# Patient Record
Sex: FEMALE | Race: WHITE | NOT HISPANIC OR LATINO | Employment: OTHER | URBAN - NONMETROPOLITAN AREA
[De-identification: names, ages, dates, MRNs, and addresses within clinical notes are randomized per-mention and may not be internally consistent; named-entity substitution may affect disease eponyms.]

---

## 2019-11-27 ENCOUNTER — APPOINTMENT (OUTPATIENT)
Dept: CT IMAGING | Facility: HOSPITAL | Age: 69
End: 2019-11-27

## 2019-11-27 ENCOUNTER — HOSPITAL ENCOUNTER (EMERGENCY)
Facility: HOSPITAL | Age: 69
Discharge: HOME | End: 2019-11-27
Admitting: EMERGENCY MEDICINE

## 2019-11-27 ENCOUNTER — APPOINTMENT (OUTPATIENT)
Dept: GENERAL RADIOLOGY | Facility: HOSPITAL | Age: 69
End: 2019-11-27

## 2019-11-27 VITALS
HEIGHT: 62 IN | SYSTOLIC BLOOD PRESSURE: 124 MMHG | OXYGEN SATURATION: 98 % | BODY MASS INDEX: 27.42 KG/M2 | TEMPERATURE: 98.3 F | WEIGHT: 149 LBS | RESPIRATION RATE: 14 BRPM | DIASTOLIC BLOOD PRESSURE: 43 MMHG | HEART RATE: 80 BPM

## 2019-11-27 DIAGNOSIS — K86.89 PANCREATIC MASS: Primary | ICD-10-CM

## 2019-11-27 DIAGNOSIS — N30.90 CYSTITIS: ICD-10-CM

## 2019-11-27 DIAGNOSIS — R11.2 NAUSEA AND VOMITING, INTRACTABILITY OF VOMITING NOT SPECIFIED, UNSPECIFIED VOMITING TYPE: ICD-10-CM

## 2019-11-27 LAB
ALBUMIN SERPL-MCNC: 4.5 G/DL (ref 3.5–5.2)
ALBUMIN/GLOB SERPL: 1.6 G/DL
ALP SERPL-CCNC: 84 U/L (ref 39–117)
ALT SERPL W P-5'-P-CCNC: 20 U/L (ref 1–33)
ANION GAP SERPL CALCULATED.3IONS-SCNC: 14 MMOL/L (ref 5–15)
APTT PPP: 27.6 SECONDS (ref 24.1–35)
AST SERPL-CCNC: 21 U/L (ref 1–32)
BACTERIA UR QL AUTO: ABNORMAL /HPF
BASOPHILS # BLD AUTO: 0.06 10*3/MM3 (ref 0–0.2)
BASOPHILS NFR BLD AUTO: 0.6 % (ref 0–1.5)
BILIRUB SERPL-MCNC: 0.3 MG/DL (ref 0.2–1.2)
BILIRUB UR QL STRIP: NEGATIVE
BUN BLD-MCNC: 17 MG/DL (ref 8–23)
BUN/CREAT SERPL: 21.5 (ref 7–25)
CALCIUM SPEC-SCNC: 9.4 MG/DL (ref 8.6–10.5)
CHLORIDE SERPL-SCNC: 97 MMOL/L (ref 98–107)
CLARITY UR: CLEAR
CO2 SERPL-SCNC: 27 MMOL/L (ref 22–29)
COLOR UR: YELLOW
CREAT BLD-MCNC: 0.79 MG/DL (ref 0.57–1)
D-LACTATE SERPL-SCNC: 2 MMOL/L (ref 0.5–2)
DEPRECATED RDW RBC AUTO: 41 FL (ref 37–54)
EOSINOPHIL # BLD AUTO: 0.02 10*3/MM3 (ref 0–0.4)
EOSINOPHIL NFR BLD AUTO: 0.2 % (ref 0.3–6.2)
ERYTHROCYTE [DISTWIDTH] IN BLOOD BY AUTOMATED COUNT: 12.4 % (ref 12.3–15.4)
GFR SERPL CREATININE-BSD FRML MDRD: 72 ML/MIN/1.73
GLOBULIN UR ELPH-MCNC: 2.8 GM/DL
GLUCOSE BLD-MCNC: 173 MG/DL (ref 65–99)
GLUCOSE BLDC GLUCOMTR-MCNC: 176 MG/DL (ref 70–130)
GLUCOSE UR STRIP-MCNC: NEGATIVE MG/DL
HCT VFR BLD AUTO: 42.6 % (ref 34–46.6)
HGB BLD-MCNC: 14.1 G/DL (ref 12–15.9)
HGB UR QL STRIP.AUTO: NEGATIVE
HYALINE CASTS UR QL AUTO: ABNORMAL /LPF
IMM GRANULOCYTES # BLD AUTO: 0.03 10*3/MM3 (ref 0–0.05)
IMM GRANULOCYTES NFR BLD AUTO: 0.3 % (ref 0–0.5)
INR PPP: 0.91 (ref 0.91–1.09)
KETONES UR QL STRIP: NEGATIVE
LEUKOCYTE ESTERASE UR QL STRIP.AUTO: ABNORMAL
LIPASE SERPL-CCNC: 26 U/L (ref 13–60)
LYMPHOCYTES # BLD AUTO: 0.87 10*3/MM3 (ref 0.7–3.1)
LYMPHOCYTES NFR BLD AUTO: 9.3 % (ref 19.6–45.3)
MAGNESIUM SERPL-MCNC: 1.6 MG/DL (ref 1.6–2.4)
MCH RBC QN AUTO: 29.7 PG (ref 26.6–33)
MCHC RBC AUTO-ENTMCNC: 33.1 G/DL (ref 31.5–35.7)
MCV RBC AUTO: 89.9 FL (ref 79–97)
MONOCYTES # BLD AUTO: 0.3 10*3/MM3 (ref 0.1–0.9)
MONOCYTES NFR BLD AUTO: 3.2 % (ref 5–12)
NEUTROPHILS # BLD AUTO: 8.05 10*3/MM3 (ref 1.7–7)
NEUTROPHILS NFR BLD AUTO: 86.4 % (ref 42.7–76)
NITRITE UR QL STRIP: NEGATIVE
NRBC BLD AUTO-RTO: 0 /100 WBC (ref 0–0.2)
PH UR STRIP.AUTO: 6 [PH] (ref 5–8)
PLATELET # BLD AUTO: 329 10*3/MM3 (ref 140–450)
PMV BLD AUTO: 9.5 FL (ref 6–12)
POTASSIUM BLD-SCNC: 4.1 MMOL/L (ref 3.5–5.2)
PROT SERPL-MCNC: 7.3 G/DL (ref 6–8.5)
PROT UR QL STRIP: NEGATIVE
PROTHROMBIN TIME: 12.5 SECONDS (ref 11.9–14.6)
RBC # BLD AUTO: 4.74 10*6/MM3 (ref 3.77–5.28)
RBC # UR: ABNORMAL /HPF
REF LAB TEST METHOD: ABNORMAL
SODIUM BLD-SCNC: 138 MMOL/L (ref 136–145)
SP GR UR STRIP: 1.02 (ref 1–1.03)
SQUAMOUS #/AREA URNS HPF: ABNORMAL /HPF
TROPONIN T SERPL-MCNC: <0.01 NG/ML (ref 0–0.03)
TSH SERPL DL<=0.05 MIU/L-ACNC: 0.68 UIU/ML (ref 0.27–4.2)
UROBILINOGEN UR QL STRIP: ABNORMAL
WBC NRBC COR # BLD: 9.33 10*3/MM3 (ref 3.4–10.8)
WBC UR QL AUTO: ABNORMAL /HPF

## 2019-11-27 PROCEDURE — 96375 TX/PRO/DX INJ NEW DRUG ADDON: CPT

## 2019-11-27 PROCEDURE — 71045 X-RAY EXAM CHEST 1 VIEW: CPT

## 2019-11-27 PROCEDURE — 85610 PROTHROMBIN TIME: CPT | Performed by: PHYSICIAN ASSISTANT

## 2019-11-27 PROCEDURE — 25010000002 ONDANSETRON PER 1 MG: Performed by: PHYSICIAN ASSISTANT

## 2019-11-27 PROCEDURE — 84484 ASSAY OF TROPONIN QUANT: CPT | Performed by: PHYSICIAN ASSISTANT

## 2019-11-27 PROCEDURE — 96365 THER/PROPH/DIAG IV INF INIT: CPT

## 2019-11-27 PROCEDURE — 80053 COMPREHEN METABOLIC PANEL: CPT | Performed by: PHYSICIAN ASSISTANT

## 2019-11-27 PROCEDURE — 93005 ELECTROCARDIOGRAM TRACING: CPT | Performed by: PHYSICIAN ASSISTANT

## 2019-11-27 PROCEDURE — 25010000002 CEFTRIAXONE PER 250 MG: Performed by: PHYSICIAN ASSISTANT

## 2019-11-27 PROCEDURE — 83605 ASSAY OF LACTIC ACID: CPT | Performed by: PHYSICIAN ASSISTANT

## 2019-11-27 PROCEDURE — 85025 COMPLETE CBC W/AUTO DIFF WBC: CPT | Performed by: PHYSICIAN ASSISTANT

## 2019-11-27 PROCEDURE — 99284 EMERGENCY DEPT VISIT MOD MDM: CPT

## 2019-11-27 PROCEDURE — 81001 URINALYSIS AUTO W/SCOPE: CPT | Performed by: PHYSICIAN ASSISTANT

## 2019-11-27 PROCEDURE — 82962 GLUCOSE BLOOD TEST: CPT

## 2019-11-27 PROCEDURE — 83735 ASSAY OF MAGNESIUM: CPT | Performed by: PHYSICIAN ASSISTANT

## 2019-11-27 PROCEDURE — 74177 CT ABD & PELVIS W/CONTRAST: CPT

## 2019-11-27 PROCEDURE — 25010000002 IOPAMIDOL 61 % SOLUTION: Performed by: PHYSICIAN ASSISTANT

## 2019-11-27 PROCEDURE — 84443 ASSAY THYROID STIM HORMONE: CPT | Performed by: PHYSICIAN ASSISTANT

## 2019-11-27 PROCEDURE — 83690 ASSAY OF LIPASE: CPT | Performed by: PHYSICIAN ASSISTANT

## 2019-11-27 PROCEDURE — 93010 ELECTROCARDIOGRAM REPORT: CPT | Performed by: INTERNAL MEDICINE

## 2019-11-27 PROCEDURE — 85730 THROMBOPLASTIN TIME PARTIAL: CPT | Performed by: PHYSICIAN ASSISTANT

## 2019-11-27 PROCEDURE — 87040 BLOOD CULTURE FOR BACTERIA: CPT | Performed by: PHYSICIAN ASSISTANT

## 2019-11-27 RX ORDER — LEVOTHYROXINE SODIUM 0.07 MG/1
75 TABLET ORAL DAILY
COMMUNITY
End: 2022-09-08 | Stop reason: DRUGHIGH

## 2019-11-27 RX ORDER — ONDANSETRON 4 MG/1
4 TABLET, FILM COATED ORAL EVERY 8 HOURS PRN
Qty: 12 TABLET | Refills: 0 | Status: SHIPPED | OUTPATIENT
Start: 2019-11-27 | End: 2021-06-23

## 2019-11-27 RX ORDER — ROSUVASTATIN CALCIUM 20 MG/1
20 TABLET, COATED ORAL DAILY
COMMUNITY

## 2019-11-27 RX ORDER — MAGNESIUM OXIDE 400 MG/1
250 TABLET ORAL 2 TIMES DAILY
COMMUNITY
End: 2021-06-23

## 2019-11-27 RX ORDER — FLUTICASONE PROPIONATE 50 MCG
2 SPRAY, SUSPENSION (ML) NASAL DAILY
COMMUNITY

## 2019-11-27 RX ORDER — ONDANSETRON 2 MG/ML
4 INJECTION INTRAMUSCULAR; INTRAVENOUS ONCE
Status: COMPLETED | OUTPATIENT
Start: 2019-11-27 | End: 2019-11-27

## 2019-11-27 RX ORDER — TRIAMCINOLONE ACETONIDE 0.25 MG/G
1 CREAM TOPICAL 2 TIMES DAILY
COMMUNITY

## 2019-11-27 RX ORDER — PANTOPRAZOLE SODIUM 20 MG/1
20 TABLET, DELAYED RELEASE ORAL DAILY
COMMUNITY
End: 2021-06-23

## 2019-11-27 RX ORDER — CEPHALEXIN 500 MG/1
500 CAPSULE ORAL 3 TIMES DAILY
Qty: 30 CAPSULE | Refills: 0 | Status: SHIPPED | OUTPATIENT
Start: 2019-11-27 | End: 2021-06-23

## 2019-11-27 RX ADMIN — CEFTRIAXONE SODIUM 1 G: 1 INJECTION, POWDER, FOR SOLUTION INTRAMUSCULAR; INTRAVENOUS at 20:19

## 2019-11-27 RX ADMIN — IOPAMIDOL 100 ML: 612 INJECTION, SOLUTION INTRAVENOUS at 18:40

## 2019-11-27 RX ADMIN — ONDANSETRON 4 MG: 2 INJECTION INTRAMUSCULAR; INTRAVENOUS at 17:18

## 2019-11-27 RX ADMIN — SODIUM CHLORIDE 1000 ML: 9 INJECTION, SOLUTION INTRAVENOUS at 17:17

## 2019-12-02 LAB
BACTERIA SPEC AEROBE CULT: NORMAL
BACTERIA SPEC AEROBE CULT: NORMAL

## 2019-12-30 ENCOUNTER — OFFICE VISIT (OUTPATIENT)
Dept: GASTROENTEROLOGY | Facility: CLINIC | Age: 69
End: 2019-12-30

## 2019-12-30 VITALS
WEIGHT: 148 LBS | TEMPERATURE: 97 F | DIASTOLIC BLOOD PRESSURE: 60 MMHG | HEIGHT: 63 IN | OXYGEN SATURATION: 97 % | BODY MASS INDEX: 26.22 KG/M2 | SYSTOLIC BLOOD PRESSURE: 126 MMHG | HEART RATE: 84 BPM

## 2019-12-30 DIAGNOSIS — K86.2 PANCREAS CYST: Primary | ICD-10-CM

## 2019-12-30 DIAGNOSIS — K21.9 GASTROESOPHAGEAL REFLUX DISEASE, ESOPHAGITIS PRESENCE NOT SPECIFIED: ICD-10-CM

## 2019-12-30 PROCEDURE — 99204 OFFICE O/P NEW MOD 45 MIN: CPT | Performed by: INTERNAL MEDICINE

## 2019-12-30 RX ORDER — SUCRALFATE 1 G/1
1 TABLET ORAL 4 TIMES DAILY
COMMUNITY

## 2019-12-30 RX ORDER — LISINOPRIL 20 MG/1
20 TABLET ORAL DAILY
COMMUNITY

## 2019-12-30 NOTE — PROGRESS NOTES
Chief Complaint   Patient presents with   • Imaging Only     had ct showed panc. mass       PCP: Sandeep White MD  REFER: Sally Marrufo, *    Subjective     Heartburn   She complains of choking. She reports no abdominal pain, no chest pain, no coughing, no globus sensation, no hoarse voice, no nausea, no sore throat or no wheezing. This is a recurrent problem. The current episode started more than 1 month ago. The problem occurs rarely. The problem has been resolved. Pertinent negatives include no fatigue or weight loss. She has tried a PPI for the symptoms. The treatment provided significant relief. Past procedures include an EGD.      Recently in our ER back in NOV for n/v abd pain all have resolved blamed on a UTI/URI  CT showed a Panc mass   Past Medical History:   Diagnosis Date   • Cancer (CMS/HCC)    • Diabetes mellitus (CMS/HCC)    • Disease of thyroid gland    • Hypertension    • Seizures (CMS/HCC)        Past Surgical History:   Procedure Laterality Date   • BREAST SURGERY         Outpatient Medications Marked as Taking for the 12/30/19 encounter (Office Visit) with Jori Sanon, DO   Medication Sig Dispense Refill   • fluticasone (FLONASE) 50 MCG/ACT nasal spray 2 sprays into the nostril(s) as directed by provider Daily.     • levothyroxine (SYNTHROID, LEVOTHROID) 75 MCG tablet Take 75 mcg by mouth Daily.     • lisinopril (PRINIVIL,ZESTRIL) 20 MG tablet Take 20 mg by mouth Daily.     • magnesium oxide (MAG-OX) 400 MG tablet Take 250 mg by mouth 2 (Two) Times a Day.     • metFORMIN (GLUCOPHAGE) 500 MG tablet Take 500 mg by mouth 2 (Two) Times a Day With Meals.     • pantoprazole (PROTONIX) 20 MG EC tablet Take 20 mg by mouth Daily.     • rosuvastatin (CRESTOR) 20 MG tablet Take 20 mg by mouth Daily.     • sucralfate (CARAFATE) 1 g tablet Take 1 g by mouth 4 (Four) Times a Day.     • triamcinolone (KENALOG) 0.025 % cream Apply 1 application topically to the appropriate area as directed 2 (Two)  "Times a Day.         No Known Allergies    Social History     Socioeconomic History   • Marital status:      Spouse name: Not on file   • Number of children: Not on file   • Years of education: Not on file   • Highest education level: Not on file   Tobacco Use   • Smoking status: Never Smoker   • Smokeless tobacco: Never Used   Substance and Sexual Activity   • Alcohol use: No     Frequency: Never   • Drug use: No       Family History   Problem Relation Age of Onset   • Colon cancer Sister    • Colon polyps Neg Hx    • Esophageal cancer Neg Hx        Review of Systems   Constitutional: Negative for fatigue, fever, unexpected weight change and weight loss.   HENT: Negative for hearing loss, hoarse voice, sore throat and voice change.    Eyes: Negative for visual disturbance.   Respiratory: Positive for choking. Negative for cough, shortness of breath and wheezing.    Cardiovascular: Negative for chest pain and palpitations.   Gastrointestinal: Negative for abdominal pain, blood in stool, nausea and vomiting.   Endocrine: Negative for polydipsia and polyuria.   Genitourinary: Negative for difficulty urinating, dysuria, hematuria and urgency.   Musculoskeletal: Negative for joint swelling and myalgias.   Skin: Negative for color change, rash and wound.   Neurological: Negative for dizziness, tremors, seizures and syncope.   Hematological: Does not bruise/bleed easily.   Psychiatric/Behavioral: Negative for agitation and confusion. The patient is not nervous/anxious.        Objective     Vitals:    12/30/19 1402   BP: 126/60   Pulse: 84   Temp: 97 °F (36.1 °C)   SpO2: 97%   Weight: 67.1 kg (148 lb)   Height: 158.8 cm (62.5\")     Body mass index is 26.64 kg/m².    Physical Exam   Constitutional: She is oriented to person, place, and time. She appears well-developed and well-nourished.   HENT:   Head: Normocephalic and atraumatic.   Eyes: Pupils are equal, round, and reactive to light. Conjunctivae are normal. No " scleral icterus.   Neck: No JVD present. No thyroid mass and no thyromegaly present.   Cardiovascular: Normal rate, regular rhythm and normal heart sounds. Exam reveals no gallop and no friction rub.   No murmur heard.  Pulmonary/Chest: Effort normal and breath sounds normal. No accessory muscle usage. No respiratory distress. She has no wheezes. She has no rales.   Abdominal: Soft. Bowel sounds are normal. She exhibits no distension, no ascites and no mass. There is no splenomegaly or hepatomegaly. There is no tenderness. There is no rebound and no guarding.   Genitourinary:   Genitourinary Comments: Rectal-Did not examine   Musculoskeletal: Normal range of motion. She exhibits no edema.   Neurological: She is alert and oriented to person, place, and time.   Deemed a reliable historian, able to converse without difficulty and able to move all extremities without difficulty   Skin: Skin is warm and dry.   Psychiatric: She has a normal mood and affect. Her behavior is normal.       Imaging Results (Most Recent)     None          Body mass index is 26.64 kg/m².    Assessment/Plan     Sandy was seen today for imaging only.    Diagnoses and all orders for this visit:    Pancreas cyst    Gastroesophageal reflux disease, esophagitis presence not specified    gave her the options of EUS vs a MRI in 6-9 months  She wants to think about the 2 options and will let me know    * Surgery not found *    Patient's Body mass index is 26.64 kg/m². BMI is within normal parameters. No follow-up required..      There are no Patient Instructions on file for this visit.

## 2021-06-23 ENCOUNTER — OFFICE VISIT (OUTPATIENT)
Dept: OBSTETRICS AND GYNECOLOGY | Facility: CLINIC | Age: 71
End: 2021-06-23

## 2021-06-23 VITALS
DIASTOLIC BLOOD PRESSURE: 74 MMHG | HEIGHT: 63 IN | BODY MASS INDEX: 22.86 KG/M2 | WEIGHT: 129 LBS | SYSTOLIC BLOOD PRESSURE: 144 MMHG

## 2021-06-23 DIAGNOSIS — N81.4 UTERINE PROLAPSE: ICD-10-CM

## 2021-06-23 DIAGNOSIS — N81.10 FEMALE CYSTOCELE: Primary | ICD-10-CM

## 2021-06-23 DIAGNOSIS — N81.6 RECTOCELE: ICD-10-CM

## 2021-06-23 DIAGNOSIS — Z46.89 FITTING AND ADJUSTMENT OF PESSARY: ICD-10-CM

## 2021-06-23 PROCEDURE — A4562 PESSARY, NON RUBBER,ANY TYPE: HCPCS | Performed by: OBSTETRICS & GYNECOLOGY

## 2021-06-23 PROCEDURE — 99203 OFFICE O/P NEW LOW 30 MIN: CPT | Performed by: OBSTETRICS & GYNECOLOGY

## 2021-06-23 PROCEDURE — 57160 INSERT PESSARY/OTHER DEVICE: CPT | Performed by: OBSTETRICS & GYNECOLOGY

## 2021-06-23 RX ORDER — DIPHENHYDRAMINE HCL 25 MG
25 TABLET ORAL EVERY 6 HOURS PRN
COMMUNITY

## 2021-06-23 RX ORDER — A/SINGAPORE/GP1908/2015 IVR-180 (AN A/MICHIGAN/45/2015 (H1N1)PDM09-LIKE VIRUS, A/HONG KONG/4801/2014, NYMC X-263B (H3N2) (AN A/HONG KONG/4801/2014-LIKE VIRUS), AND B/BRISBANE/60/2008, WILD TYPE (A B/BRISBANE/60/2008-LIKE VIRUS) 15; 15; 15 UG/.5ML; UG/.5ML; UG/.5ML
INJECTION, SUSPENSION INTRAMUSCULAR
COMMUNITY
End: 2022-06-09

## 2021-06-23 NOTE — PROGRESS NOTES
"Subjective     Chief Complaint   Patient presents with   • Vaginal Prolapse     pt here today as new pt for prolapse. pt voices that her prolapse feels like she is sitting on an egg. pt also voices having urinary frequency. pt voices no other concerns.        Sandy LAW is a 71 y.o. year old who presents to be seen for pelvic prolapse.  Patient c/o pressure from \"bulge\" that makes her feel like she is \"sitting on an egg\".    The patient is not s/p hysterectomy.  She reports positive vaginal pressure, bulge outside her body, urinary incontinence and urinary hesitancy, but denies urinary retention causing recurrent bladder infections and stool trapping.  The patient feels like the problem began 4-5 years ago.  She is not currently sexually active.  Sandy LAW has not had surgery for this problem in the past.    Past Medical History:   Diagnosis Date   • Breast cancer (CMS/HCC)    • Diabetes mellitus (CMS/HCC)    • Disease of thyroid gland    • Hypertension    • Seizures (CMS/HCC)        Current Outpatient Medications:   •  diphenhydrAMINE (Benadryl Allergy) 25 MG tablet, Take 25 mg by mouth Every 6 (Six) Hours As Needed for Itching., Disp: , Rfl:   •  fluticasone (FLONASE) 50 MCG/ACT nasal spray, 2 sprays into the nostril(s) as directed by provider Daily., Disp: , Rfl:   •  Influenza Vac A&B SA Adj Quad (Fluad Quadrivalent) 0.5 ML prefilled syringe injection, Fluad Quad 0969-0719(65yr up)(PF) 60 mcg (15 mcg x 4)/0.5mL IM syringe, Disp: , Rfl:   •  levothyroxine (SYNTHROID, LEVOTHROID) 75 MCG tablet, Take 75 mcg by mouth Daily., Disp: , Rfl:   •  lisinopril (PRINIVIL,ZESTRIL) 20 MG tablet, Take 20 mg by mouth Daily., Disp: , Rfl:   •  metFORMIN (GLUCOPHAGE) 500 MG tablet, Take 500 mg by mouth 2 (Two) Times a Day With Meals., Disp: , Rfl:   •  rosuvastatin (CRESTOR) 20 MG tablet, Take 20 mg by mouth Daily., Disp: , Rfl:   •  sucralfate (CARAFATE) 1 g tablet, Take 1 g by mouth 4 (Four) Times a Day., Disp: , " "Rfl:   •  triamcinolone (KENALOG) 0.025 % cream, Apply 1 application topically to the appropriate area as directed 2 (Two) Times a Day., Disp: , Rfl:   Family History   Problem Relation Age of Onset   • Colon cancer Sister    • Prostate cancer Brother    • Colon polyps Neg Hx    • Esophageal cancer Neg Hx      Social History     Socioeconomic History   • Marital status:      Spouse name: Not on file   • Number of children: Not on file   • Years of education: Not on file   • Highest education level: Not on file   Tobacco Use   • Smoking status: Never Smoker   • Smokeless tobacco: Never Used   Substance and Sexual Activity   • Alcohol use: No   • Drug use: No   • Sexual activity: Not Currently     Partners: Male     No Known Allergies    Family History   Problem Relation Age of Onset   • Colon cancer Sister    • Prostate cancer Brother    • Colon polyps Neg Hx    • Esophageal cancer Neg Hx      Review of Systems   Constitutional: Negative for activity change and unexpected weight change.   Eyes: Positive for visual disturbance (wears glasses).   Respiratory: Negative for shortness of breath.    Cardiovascular: Negative for chest pain.   Gastrointestinal: Positive for diarrhea (sometimes). Negative for abdominal pain.   Genitourinary: Positive for difficulty urinating (hesitancy), enuresis (both JOHN and urge incontinence based on history) and vaginal bleeding (there has been trace bleeding on her pad, but only intermittently about once/week.  Unsure whether it is vaginal or bladder). Negative for frequency.   Musculoskeletal: Positive for arthralgias. Negative for back pain.   Neurological: Positive for dizziness (positional, with fast movement). Negative for headaches.   Psychiatric/Behavioral: Positive for dysphoric mood (feels like mood is stable now, but did have depression after   last year).           Objective   /74   Ht 158.8 cm (62.5\")   Wt 58.5 kg (129 lb)   BMI 23.22 kg/m² " "    Physical Exam  Vitals and nursing note reviewed.   Constitutional:       General: She is not in acute distress.     Appearance: She is well-developed.   HENT:      Head: Normocephalic and atraumatic.   Neck:      Thyroid: No thyromegaly.   Pulmonary:      Effort: Pulmonary effort is normal.   Abdominal:      General: There is no distension.      Palpations: Abdomen is soft.      Tenderness: There is no abdominal tenderness.   Genitourinary:     General: Normal vulva.      Comments: Grade IV cystocele, grade I-II rectocele.  Moderate uterine prolapse.  Mucosa pale with atrophy.  Patient fitted with #4 incontinence dish with knob.  Musculoskeletal:         General: Normal range of motion.      Cervical back: Normal range of motion.   Skin:     General: Skin is warm and dry.   Neurological:      Mental Status: She is alert and oriented to person, place, and time.   Psychiatric:         Behavior: Behavior normal.         Judgment: Judgment normal.         Imaging   No data reviewed       Assessment & Plan    Diagnoses and all orders for this visit:    1. Female cystocele (Primary): Patient with grade 4 cystocele, and accompanying uterine prolapse approximately longterm down the length of the vagina.  Patient also has a mild rectocele, which is asymptomatic.  Bulge from cystocele is uncomfortable for patient because she feels like she is \"sitting on it\" all the time.  We have discussed the options of [facility] total laparoscopic hysterectomy with salpingo-oophorectomy and accompanying anterior vaginal repair versus use of a pessary to support her pelvic organ prolapse.  The patient would prefer to try a nonsurgical approach first and was fitted today with a pessary.  Patient reported a #4 incontinence dish with knob to be comfortable, even with different maneuvers in the office.  Patient will return to the office in 2 weeks for follow-up, and be taught how to remove, clean, and replace the pessary at that " time.  Comments:  grade IV    2. Uterine prolapse    3. Rectocele  Comments:  grade I-II    4. Fitting and adjustment of pessary          Feli Mcdonald MD  6/23/2021  10:27 CDT

## 2021-07-07 ENCOUNTER — OFFICE VISIT (OUTPATIENT)
Dept: OBSTETRICS AND GYNECOLOGY | Facility: CLINIC | Age: 71
End: 2021-07-07

## 2021-07-07 VITALS
BODY MASS INDEX: 22.86 KG/M2 | DIASTOLIC BLOOD PRESSURE: 70 MMHG | WEIGHT: 129 LBS | SYSTOLIC BLOOD PRESSURE: 122 MMHG | HEIGHT: 63 IN

## 2021-07-07 DIAGNOSIS — N81.10 FEMALE CYSTOCELE: Primary | ICD-10-CM

## 2021-07-07 DIAGNOSIS — N81.6 RECTOCELE: ICD-10-CM

## 2021-07-07 DIAGNOSIS — N81.4 UTERINE PROLAPSE: ICD-10-CM

## 2021-07-07 PROCEDURE — 99213 OFFICE O/P EST LOW 20 MIN: CPT | Performed by: OBSTETRICS & GYNECOLOGY

## 2021-07-07 NOTE — PROGRESS NOTES
"PESSARY CHECK-UP    Subjective   Chief Complaint   Patient presents with   • Pessary Check     pt here today for follow up on pessary. pt voices her pessary fell out before she got home from last visit on 6-23-21. pt voices no other concerns.       Sandy LAW is a 71 y.o. year old who presents to be seen for follow-up of her pessary.  Patient left office with a #4 incontinence dish with knob, and reports that it fell out before she ever got home.  Patient wants to try a different style/size.    Patient here two weeks ago and fitted for a pessary.  No Additional Complaints Reported    The following portions of the patient's history were reviewed and updated as appropriate:current medications and allergies    Review of Systems      Objective   /70   Ht 158.8 cm (62.5\")   Wt 58.5 kg (129 lb)   BMI 23.22 kg/m²     General:  well developed; well nourished  no acute distress   Pelvis: Clinical staff was present for exam.  Patient reported #5 long-stemmed Gelhorn to be comfortable with sitting, standing and walking.     Lab Review   No data reviewed    Imaging   No data reviewed        Assessment   1.  Patient fitted for new pessary  1. Reports to be comfortable     Plan   1. Follow up 2 weeks    No orders of the defined types were placed in this encounter.         This note was electronically signed.    Feli Mcdonald MD  July 7, 2021  10:49 CDT    "

## 2021-07-20 ENCOUNTER — OFFICE VISIT (OUTPATIENT)
Dept: OBSTETRICS AND GYNECOLOGY | Facility: CLINIC | Age: 71
End: 2021-07-20

## 2021-07-20 VITALS
DIASTOLIC BLOOD PRESSURE: 72 MMHG | BODY MASS INDEX: 22.86 KG/M2 | WEIGHT: 129 LBS | SYSTOLIC BLOOD PRESSURE: 118 MMHG | HEIGHT: 63 IN

## 2021-07-20 DIAGNOSIS — N81.6 RECTOCELE: ICD-10-CM

## 2021-07-20 DIAGNOSIS — N81.10 FEMALE CYSTOCELE: Primary | ICD-10-CM

## 2021-07-20 DIAGNOSIS — Z46.89 PESSARY MAINTENANCE: ICD-10-CM

## 2021-07-20 PROCEDURE — 99212 OFFICE O/P EST SF 10 MIN: CPT | Performed by: OBSTETRICS & GYNECOLOGY

## 2021-07-20 NOTE — PROGRESS NOTES
"PESSARY CHECK-UP    Subjective   Chief Complaint   Patient presents with   • Pessary Check     pt here today for pessary check. pt voices she is doing well and voices no concerns.      Sandy LAW is a 71 y.o. year old who presents to be seen for follow-up of her pessary.  Patient previously with a #4 incontinence dish that fell out, but it was replaced with a #5 Gelhorn two weeks ago.      Overall she is satisfied with how this pessary is doing.  She is not aware of it being present.  · She is not removing the pessary herself.  · She is able to empty were bladder without difficulty, which is an improvement.  · There is not significant urinary incontinence.  She says leakage is unchanged from previous.  · She is not having difficulty with bowel function.     No Additional Complaints Reported    The following portions of the patient's history were reviewed and updated as appropriate:current medications and allergies    Review of Systems      Objective   /72   Ht 158.8 cm (62.5\")   Wt 58.5 kg (129 lb)   BMI 23.22 kg/m²     General:  well developed; well nourished  no acute distress   Pelvis: Clinical staff was present for exam.  Pessary fits well and is in correct position.  Pessary not removed since it has only been in place for two weeks     Lab Review   No data reviewed    Imaging   No data reviewed        Assessment   1. Symptomatic prolapse - well controlled with current pessary.  2. Patient pleased and reports bladder empties better now     Plan   1. Her pessary was not removed  2. Follow up 8 weeks    No orders of the defined types were placed in this encounter.         This note was electronically signed.    Feli Mcdonald MD  July 20, 2021  11:32 CDT    "

## 2021-09-28 ENCOUNTER — OFFICE VISIT (OUTPATIENT)
Dept: OBSTETRICS AND GYNECOLOGY | Facility: CLINIC | Age: 71
End: 2021-09-28

## 2021-09-28 VITALS
WEIGHT: 135 LBS | BODY MASS INDEX: 23.92 KG/M2 | HEIGHT: 63 IN | DIASTOLIC BLOOD PRESSURE: 62 MMHG | SYSTOLIC BLOOD PRESSURE: 118 MMHG

## 2021-09-28 DIAGNOSIS — Z46.89 PESSARY MAINTENANCE: ICD-10-CM

## 2021-09-28 DIAGNOSIS — N81.4 UTERINE PROLAPSE: ICD-10-CM

## 2021-09-28 DIAGNOSIS — N81.10 FEMALE CYSTOCELE: Primary | ICD-10-CM

## 2021-09-28 DIAGNOSIS — N81.6 RECTOCELE: ICD-10-CM

## 2021-09-28 PROCEDURE — 57160 INSERT PESSARY/OTHER DEVICE: CPT | Performed by: OBSTETRICS & GYNECOLOGY

## 2021-09-28 PROCEDURE — A4562 PESSARY, NON RUBBER,ANY TYPE: HCPCS | Performed by: OBSTETRICS & GYNECOLOGY

## 2021-09-28 NOTE — PROGRESS NOTES
"PESSARY CHECK-UP    Subjective   Chief Complaint   Patient presents with   • Pessary Check     pt here today for pessary check. pt voices no concerns.      Sandy LAW is a 71 y.o. year old who presents to be seen for follow-up of her pessary.    Overall she is satisfied with how this pessary is doing.  She is not aware of it being present.  · She is not removing the pessary herself.  · She is able to empty were bladder without difficulty.  · There is not significant urinary incontinence - just a small amount.    · There is significant vaginal discharge present at times, but other times she says it is not a problem at all.  She is using nothing to help decrease her vaginal discharge - patient says that it does not really bother her.  · She is not having difficulty with bowel function.     No Additional Complaints Reported    The following portions of the patient's history were reviewed and updated as appropriate:current medications and allergies    Review of Systems      Objective   /62   Ht 158.8 cm (62.5\")   Wt 61.2 kg (135 lb)   BMI 24.30 kg/m²     General:  well developed; well nourished  no acute distress   Pelvis: Clinical staff was present for exam.  Vaginal mucosa intact, without erosion.  2 3/4 inch Gelhorn very difficult to remove, so replace with 2 1/2 inch Gelhorn today     Lab Review   No data reviewed    Imaging   No data reviewed        Assessment   1. Symptomatic prolapse - well controlled with current pessary.  2. Current pessary difficult to remove     Plan   1. Her pessary was removed, cleaned and replaced with a 2 2/1 inch Gelhorn.  2. Follow up 8 weeks    No orders of the defined types were placed in this encounter.         This note was electronically signed.    Feli Mcdonald MD  September 28, 2021  15:03 CDT    "

## 2021-12-02 ENCOUNTER — OFFICE VISIT (OUTPATIENT)
Dept: OBSTETRICS AND GYNECOLOGY | Facility: CLINIC | Age: 71
End: 2021-12-02

## 2021-12-02 VITALS
DIASTOLIC BLOOD PRESSURE: 70 MMHG | HEIGHT: 63 IN | BODY MASS INDEX: 24.1 KG/M2 | SYSTOLIC BLOOD PRESSURE: 124 MMHG | WEIGHT: 136 LBS

## 2021-12-02 DIAGNOSIS — N81.4 UTERINE PROLAPSE: ICD-10-CM

## 2021-12-02 DIAGNOSIS — N81.10 FEMALE CYSTOCELE: ICD-10-CM

## 2021-12-02 DIAGNOSIS — Z46.89 PESSARY MAINTENANCE: Primary | ICD-10-CM

## 2021-12-02 DIAGNOSIS — N81.6 RECTOCELE: ICD-10-CM

## 2021-12-02 PROCEDURE — 99213 OFFICE O/P EST LOW 20 MIN: CPT | Performed by: OBSTETRICS & GYNECOLOGY

## 2021-12-02 NOTE — PROGRESS NOTES
"PESSARY CHECK-UP    Subjective   Chief Complaint   Patient presents with   • Pessary Check     pt here today for pessary check. pt voices no concerns.      Sandy LAW is a 71 y.o. year old who presents to be seen for follow-up of her pessary.    Overall she is satisfied with how this pessary is doing.  She is not aware of it being present.  · She is not removing the pessary herself.  · She is able to empty were bladder without difficulty.  · There is not significant urinary incontinence - just a small amount.    · There is significant vaginal discharge present at times, but other times she says it is not a problem at all.  She is using nothing to help decrease her vaginal discharge - patient says that it does not really bother her.  · She is not having difficulty with bowel function.     No Additional Complaints Reported    The following portions of the patient's history were reviewed and updated as appropriate:current medications and allergies    Review of Systems      Objective   /70   Ht 158.8 cm (62.5\")   Wt 61.7 kg (136 lb)   BMI 24.48 kg/m²     General:  well developed; well nourished  no acute distress   Pelvis: Clinical staff was present for exam.  Vaginal mucosa intact, without erosion.  2 1/2 inch Gelhorn removed, cleaned and replaced.  Patient tolerated much better than last time since we decreased size slightly at her last visit.     Lab Review   No data reviewed    Imaging   No data reviewed        Assessment   1. Symptomatic prolapse - well controlled with current pessary.  2. 2 2/1 inch gelhorn much easier to remove and replace than previous 2 3/4     Plan   1. Her pessary was removed, cleaned and replaced  2. Follow up 3 months.  Patient wants to wait until March in case there is bad weather    No orders of the defined types were placed in this encounter.         This note was electronically signed.    Feli Mcdonald MD  December 2, 2021  11:52 CST    "

## 2022-03-03 ENCOUNTER — OFFICE VISIT (OUTPATIENT)
Dept: OBSTETRICS AND GYNECOLOGY | Facility: CLINIC | Age: 72
End: 2022-03-03

## 2022-03-03 VITALS
WEIGHT: 138 LBS | SYSTOLIC BLOOD PRESSURE: 164 MMHG | BODY MASS INDEX: 24.45 KG/M2 | HEIGHT: 63 IN | DIASTOLIC BLOOD PRESSURE: 84 MMHG

## 2022-03-03 DIAGNOSIS — N81.4 UTERINE PROLAPSE: ICD-10-CM

## 2022-03-03 DIAGNOSIS — N81.10 FEMALE CYSTOCELE: ICD-10-CM

## 2022-03-03 DIAGNOSIS — N81.6 RECTOCELE: ICD-10-CM

## 2022-03-03 DIAGNOSIS — Z46.89 PESSARY MAINTENANCE: Primary | ICD-10-CM

## 2022-03-03 PROCEDURE — 99213 OFFICE O/P EST LOW 20 MIN: CPT | Performed by: OBSTETRICS & GYNECOLOGY

## 2022-03-03 NOTE — PROGRESS NOTES
"PESSARY CHECK-UP    Subjective   Chief Complaint   Patient presents with   • Pessary Check     pt here today for pessary check. pt voices doing well. pt voices no concerns.      Sandy LAW is a 71 y.o. year old who presents to be seen for follow-up of her pessary.    Overall she is satisfied with how this pessary is doing.  She is not aware of it being present.  · She is not removing the pessary herself.  · She is able to empty were bladder without difficulty.  · There is not significant urinary incontinence - just a small amount.    · There is significant vaginal discharge present at times, but other times she says it is not a problem at all.  She is using nothing to help decrease her vaginal discharge - patient says that it does not really bother her.  · She is not having difficulty with bowel function.     No Additional Complaints Reported    The following portions of the patient's history were reviewed and updated as appropriate:current medications and allergies    Review of Systems   Genitourinary: Positive for enuresis (wears pads all the time). Negative for difficulty urinating and pelvic pain.         Objective   /84   Ht 158.8 cm (62.5\")   Wt 62.6 kg (138 lb)   BMI 24.84 kg/m²     General:  well developed; well nourished  no acute distress   Pelvis: Clinical staff was present for exam.  Vaginal mucosa intact, without erosion.  2 1/2 inch Gelhorn removed, cleaned and replaced.  Patient tolerated much better than last time since we decreased size slightly at her last visit.     Lab Review   No data reviewed    Imaging   No data reviewed        Assessment   1. Symptomatic prolapse - well controlled with current pessary.  2. 2 2/1 inch gelhorn much easier to remove and replace than previous 2 3/4     Plan   1. Her pessary was removed, cleaned and replaced  2. Follow up 3 months.    No orders of the defined types were placed in this encounter.         This note was electronically signed.    Feli " MD Harry  March 3, 2022  11:25 CST

## 2022-06-09 ENCOUNTER — OFFICE VISIT (OUTPATIENT)
Dept: OBSTETRICS AND GYNECOLOGY | Facility: CLINIC | Age: 72
End: 2022-06-09

## 2022-06-09 VITALS
HEIGHT: 62 IN | BODY MASS INDEX: 26.13 KG/M2 | DIASTOLIC BLOOD PRESSURE: 74 MMHG | WEIGHT: 142 LBS | SYSTOLIC BLOOD PRESSURE: 138 MMHG

## 2022-06-09 DIAGNOSIS — N81.10 FEMALE CYSTOCELE: ICD-10-CM

## 2022-06-09 DIAGNOSIS — R32 URINARY INCONTINENCE, UNSPECIFIED TYPE: ICD-10-CM

## 2022-06-09 DIAGNOSIS — Z46.89 PESSARY MAINTENANCE: Primary | ICD-10-CM

## 2022-06-09 PROCEDURE — 99213 OFFICE O/P EST LOW 20 MIN: CPT | Performed by: OBSTETRICS & GYNECOLOGY

## 2022-06-09 NOTE — PROGRESS NOTES
"PESSARY CHECK-UP    Subjective   Chief Complaint   Patient presents with   • Pessary maintanance     Pt is here for a check on her pessary.  Pt has no complaints or concerns today.      Sandy LAW is a 72 y.o. year old who presents to be seen for follow-up of her pessary.    Overall she is satisfied with how this pessary is doing.  She is not aware of it being present.  · She is not removing the pessary herself.  · She is able to empty were bladder without difficulty.  · There is urinary incontinence - just a small amount, but she has to wear a pad all the time.    · There is significant vaginal discharge present at times, but other times she says it is not a problem at all.  She is using nothing to help decrease her vaginal discharge - patient says that it does not really bother her.  · She is not having difficulty with bowel function.     No Additional Complaints Reported    The following portions of the patient's history were reviewed and updated as appropriate:current medications and allergies    Review of Systems   Respiratory: Negative for shortness of breath.    Cardiovascular: Negative for chest pain.   Genitourinary: Positive for enuresis (wears pads all the time.  No worse with pessary in). Negative for difficulty urinating and pelvic pain.         Objective   /74   Ht 157.5 cm (62\")   Wt 64.4 kg (142 lb)   BMI 25.97 kg/m²     General:  well developed; well nourished  no acute distress   Pelvis: Clinical staff was present for exam.  Vaginal mucosa intact, without erosion.  2 1/2 inch Gelhorn removed, cleaned and replaced.       Lab Review   No data reviewed    Imaging   No data reviewed        Assessment   1. Symptomatic prolapse - well controlled with current pessary.  2. 2 2/1 inch gelhorn much easier to remove and replace than previous 2 3/4     Plan   1. Her pessary was removed, cleaned and replaced  2. Follow up 3 months.  3. Again discussed surgery as an option, especially since a " concurrent sling might also help with her incontinence.  Patient says she has too much to do around her house and does not want to take the time to have surgery, at least not now    No orders of the defined types were placed in this encounter.         This note was electronically signed.    Feli Mcdonald MD  June 9, 2022  12:29 CDT

## 2022-09-08 ENCOUNTER — OFFICE VISIT (OUTPATIENT)
Dept: OBSTETRICS AND GYNECOLOGY | Facility: CLINIC | Age: 72
End: 2022-09-08

## 2022-09-08 VITALS
SYSTOLIC BLOOD PRESSURE: 142 MMHG | BODY MASS INDEX: 25.58 KG/M2 | DIASTOLIC BLOOD PRESSURE: 72 MMHG | HEIGHT: 62 IN | WEIGHT: 139 LBS

## 2022-09-08 DIAGNOSIS — N81.10 FEMALE CYSTOCELE: ICD-10-CM

## 2022-09-08 DIAGNOSIS — Z46.89 PESSARY MAINTENANCE: ICD-10-CM

## 2022-09-08 DIAGNOSIS — N81.6 RECTOCELE: ICD-10-CM

## 2022-09-08 DIAGNOSIS — R32 URINARY INCONTINENCE, UNSPECIFIED TYPE: Primary | ICD-10-CM

## 2022-09-08 DIAGNOSIS — N81.4 UTERINE PROLAPSE: ICD-10-CM

## 2022-09-08 PROCEDURE — 99213 OFFICE O/P EST LOW 20 MIN: CPT | Performed by: OBSTETRICS & GYNECOLOGY

## 2022-09-08 RX ORDER — LEVOTHYROXINE SODIUM 0.1 MG/1
TABLET ORAL
COMMUNITY
Start: 2022-06-29

## 2022-09-08 RX ORDER — METFORMIN HYDROCHLORIDE 500 MG/1
TABLET, EXTENDED RELEASE ORAL
COMMUNITY
Start: 2022-08-24

## 2022-09-08 NOTE — PROGRESS NOTES
"PESSARY CHECK-UP    Subjective   Chief Complaint   Patient presents with   • Pessary Check     Pt here today for pessary check. Pt doing well. Pt voices no concerns.      Sandy LAW is a 72 y.o. year old who presents to be seen for follow-up of her pessary.    Overall she is satisfied with how this pessary is doing.  She is not aware of it being present.  · She is not removing the pessary herself.  · She is able to empty were bladder without difficulty, but feels like it may not get completely empty all the time.  · There is urinary incontinence - just a small amount, but she has to wear a pad all the time.    · There is significant vaginal discharge present at times, but other times she says it is not a problem at all.  She is using nothing to help decrease her vaginal discharge - patient says that it does not really bother her.  · She is not having difficulty with bowel function.     No Additional Complaints Reported    The following portions of the patient's history were reviewed and updated as appropriate:current medications and allergies    Review of Systems   Respiratory: Negative for shortness of breath.    Cardiovascular: Negative for chest pain.   Genitourinary: Positive for enuresis (wears pads all the time.  No worse with pessary in). Negative for difficulty urinating and pelvic pain.         Objective   /72   Ht 157.5 cm (62\")   Wt 63 kg (139 lb)   BMI 25.42 kg/m²     General:  well developed; well nourished  no acute distress   Pelvis: Clinical staff was present for exam.  Vaginal mucosa intact, without erosion.  2 1/2 inch Gelhorn removed, cleaned and replaced.       Lab Review   No data reviewed    Imaging   No data reviewed        Assessment   1. Symptomatic prolapse - well controlled with current pessary.  2. 2 2/1 inch gelhorn much easier to remove and replace than previous 2 3/4     Plan   1. Her pessary was removed, cleaned and replaced  2. Follow up 3 months.  3. Again discussed " surgery as an option, especially since a concurrent sling might also help with her incontinence.  Patient says she cannot have surgery, at least not now, but wants to have surgical correction in the future    No orders of the defined types were placed in this encounter.         This note was electronically signed.    Feli Mcdonald MD  September 8, 2022  12:14 CDT

## 2022-12-08 ENCOUNTER — OFFICE VISIT (OUTPATIENT)
Dept: OBSTETRICS AND GYNECOLOGY | Facility: CLINIC | Age: 72
End: 2022-12-08

## 2022-12-08 VITALS
HEIGHT: 62 IN | DIASTOLIC BLOOD PRESSURE: 74 MMHG | SYSTOLIC BLOOD PRESSURE: 134 MMHG | WEIGHT: 135 LBS | BODY MASS INDEX: 24.84 KG/M2

## 2022-12-08 DIAGNOSIS — Z46.89 PESSARY MAINTENANCE: ICD-10-CM

## 2022-12-08 DIAGNOSIS — N81.4 UTERINE PROLAPSE: Primary | ICD-10-CM

## 2022-12-08 PROCEDURE — 99213 OFFICE O/P EST LOW 20 MIN: CPT | Performed by: OBSTETRICS & GYNECOLOGY

## 2022-12-27 NOTE — PROGRESS NOTES
"PESSARY CHECK-UP    Subjective   Chief Complaint   Patient presents with   • Pessary Check     Pt here today for pessary check. Pt voices no concerns.      Sandy LAW is a 72 y.o. year old who presents to be seen for 3 month follow-up of her pessary.    Overall she is satisfied with how this pessary is doing.  She is not aware of it being present.  · She is not removing the pessary herself.  · She is able to empty were bladder without difficulty, but feels like it may not get completely empty all the time - this is unchanged from previous reports.  · There is urinary incontinence - just a small amount, but she has to wear a pad all the time.    · There is significant vaginal discharge present at times, but other times she says it is not a problem at all.  She is using nothing to help decrease her vaginal discharge - patient says that it does not really bother her.  · She is not having any difficulty with bowel function.     No Additional Complaints Reported    The following portions of the patient's history were reviewed and updated as appropriate:current medications and allergies    Review of Systems   Respiratory: Negative for shortness of breath.    Cardiovascular: Negative for chest pain.   Genitourinary: Positive for enuresis (wears pads all the time.  No worse with pessary in). Negative for difficulty urinating and pelvic pain.         Objective   /74   Ht 157.5 cm (62\")   Wt 61.2 kg (135 lb)   BMI 24.69 kg/m²     General:  well developed; well nourished  no acute distress   Pelvis: Clinical staff was present for exam.  Vaginal mucosa intact, without erosion.  2 1/2 inch Gelhorn removed, cleaned and replaced.       Lab Review   No data reviewed    Imaging   No data reviewed        Assessment   1. Symptomatic prolapse - well controlled with current pessary.  2. 2 2/1 inch gelhorn much easier to remove and replace than previous 2 3/4 that Sandy wore for a long time     Plan   1. Her pessary " was removed, cleaned and replaced  2. Follow up 3 months.  3. Again discussed surgery as an option, especially since a concurrent sling might also help with her incontinence.  Patient says she cannot have surgery, at least not now, but wants to have surgical correction in the future    No orders of the defined types were placed in this encounter.         This note was electronically signed.    Feli Mcdonald MD  December 27, 2022  12:53 CST

## 2023-03-16 ENCOUNTER — OFFICE VISIT (OUTPATIENT)
Dept: OBSTETRICS AND GYNECOLOGY | Facility: CLINIC | Age: 73
End: 2023-03-16
Payer: MEDICARE

## 2023-03-16 VITALS
BODY MASS INDEX: 25.21 KG/M2 | DIASTOLIC BLOOD PRESSURE: 82 MMHG | HEIGHT: 62 IN | SYSTOLIC BLOOD PRESSURE: 130 MMHG | WEIGHT: 137 LBS

## 2023-03-16 DIAGNOSIS — N81.4 UTERINE PROLAPSE: Primary | ICD-10-CM

## 2023-03-16 DIAGNOSIS — Z46.89 PESSARY MAINTENANCE: ICD-10-CM

## 2023-03-16 PROCEDURE — 99213 OFFICE O/P EST LOW 20 MIN: CPT | Performed by: OBSTETRICS & GYNECOLOGY

## 2023-03-16 NOTE — PROGRESS NOTES
"PESSARY CHECK-UP    Subjective   Chief Complaint   Patient presents with   • Pessary Check     Patient here for pessary check. Patient currently has 2 12 inch gelhorn. Patient denies questions or concerns today.     Sandy LAW is a 72 y.o. year old who presents to be seen for 3 month follow-up of her pessary.    Overall she is satisfied with how this pessary is doing.  She is not aware of it being present.  Overall, patient still very pleased   · She is not removing the pessary herself.  · She is able to empty were bladder without difficulty, but feels like it may not get completely empty all the time - this is unchanged from previous reports.  · There is urinary incontinence - just a small amount, but she has to wear a pad all the time.    · There is significant vaginal discharge present at times, but other times she says it is not a problem at all.  She is using nothing to help decrease her vaginal discharge - patient says that it does not really bother her.  · She is not having any difficulty with bowel function.     No Additional Complaints Reported    The following portions of the patient's history were reviewed and updated as appropriate:current medications and allergies    Review of Systems   Respiratory: Negative for shortness of breath.    Cardiovascular: Negative for chest pain.   Genitourinary: Positive for enuresis (wears pads all the time.  No worse with pessary in). Negative for difficulty urinating, pelvic pain, vaginal bleeding, vaginal discharge and vaginal pain.         Objective   /82   Ht 157.5 cm (62\")   Wt 62.1 kg (137 lb)   BMI 25.06 kg/m²     General:  well developed; well nourished  no acute distress   Pelvis: Clinical staff was present for exam.  Vaginal mucosa intact, without erosion.  2 1/2 inch Gelhorn removed, cleaned and replaced.       Lab Review   No data reviewed    Imaging   No data reviewed        Assessment   1. Symptomatic prolapse - well controlled with current " pessary.  2. 2 1/2 inch gelhorn much easier to remove and replace than previous 2 3/4 that Sandy wore for a long time, but was harder to get out this time than last.  May consider going down another size at her next visit.     Plan   1. Her pessary was removed, cleaned and replaced  2. Follow up 3 months.  3. Again discussed surgery as an option, especially since a concurrent sling might also help with her incontinence.  Patient says she cannot have surgery, at least not now, but wants to have surgical correction in the future    No orders of the defined types were placed in this encounter.         This note was electronically signed.    Feli Mcdonald MD  March 16, 2023  11:47 CDT

## 2023-10-18 ENCOUNTER — OFFICE VISIT (OUTPATIENT)
Dept: OBSTETRICS AND GYNECOLOGY | Facility: CLINIC | Age: 73
End: 2023-10-18
Payer: MEDICARE

## 2023-10-18 VITALS
SYSTOLIC BLOOD PRESSURE: 144 MMHG | WEIGHT: 137 LBS | DIASTOLIC BLOOD PRESSURE: 74 MMHG | BODY MASS INDEX: 25.21 KG/M2 | HEIGHT: 62 IN

## 2023-10-18 DIAGNOSIS — R32 URINARY INCONTINENCE, UNSPECIFIED TYPE: ICD-10-CM

## 2023-10-18 DIAGNOSIS — Z46.89 PESSARY MAINTENANCE: ICD-10-CM

## 2023-10-18 DIAGNOSIS — N81.4 UTERINE PROLAPSE: Primary | ICD-10-CM

## 2023-10-18 PROCEDURE — 99213 OFFICE O/P EST LOW 20 MIN: CPT | Performed by: OBSTETRICS & GYNECOLOGY

## 2023-10-18 NOTE — PROGRESS NOTES
"PESSARY CHECK-UP    Subjective   Chief Complaint   Patient presents with    Pessary Check     Pt here today for pessary check. Pt doing well no concerns.      Sandy LAW is a 73 y.o. year old who presents to be seen for 3 month follow-up of her pessary.      Overall she is satisfied with how this pessary is doing.  She is not aware of it being present.  Overall, patient still very pleased and reports that her pessary still feels like it is fitting well - but she does remember how hard it was to get in/out at her last visit.  She is not removing the pessary herself.  She is able to empty were bladder without difficulty, but feels like it may not get completely empty all the time - this is unchanged from previous reports.  There is urinary incontinence - just a small amount, but she has to wear a pad all the time.    There is significant vaginal discharge present at times, but other times she says it is not a problem at all.  She is using nothing to help decrease her vaginal discharge - patient says that it does not really bother her.  She is not having any difficulty with bowel function.     No Additional Complaints Reported    The following portions of the patient's history were reviewed and updated as appropriate:current medications and allergies    Review of Systems   Respiratory:  Negative for shortness of breath.    Cardiovascular:  Negative for chest pain.   Genitourinary:  Positive for enuresis (wears pads all the time.  No worse with pessary in). Negative for difficulty urinating, pelvic pain, vaginal bleeding, vaginal discharge and vaginal pain.         Objective   /74   Ht 157.5 cm (62\")   Wt 62.1 kg (137 lb)   BMI 25.06 kg/m²     General:  well developed; well nourished  no acute distress   Pelvis: Clinical staff was present for exam.  Vaginal mucosa intact, without erosion.  #4 Gelhorn removed and replaced with #3 today     Lab Review   No data reviewed    Imaging   No data reviewed      "   Assessment   Symptomatic prolapse - well controlled with current pessary.  #4 gelhorn  was harder to get out las time.  Patient wants to try next size smaller     Plan   Her pessary was removed, cleaned and replaced with a #3 Gelhorn  Follow up 3 months.  Patient being sent home with the #4 Gellhorn, and will return to the office if she wants to switch back to that 1      No orders of the defined types were placed in this encounter.         This note was electronically signed.    Feli Mcdonald MD  October 18, 2023  14:32 CDT

## 2023-12-27 ENCOUNTER — OFFICE VISIT (OUTPATIENT)
Dept: OBSTETRICS AND GYNECOLOGY | Facility: CLINIC | Age: 73
End: 2023-12-27
Payer: MEDICARE

## 2023-12-27 VITALS
HEIGHT: 62 IN | BODY MASS INDEX: 25.76 KG/M2 | DIASTOLIC BLOOD PRESSURE: 78 MMHG | SYSTOLIC BLOOD PRESSURE: 126 MMHG | WEIGHT: 140 LBS

## 2023-12-27 DIAGNOSIS — R32 URINARY INCONTINENCE, UNSPECIFIED TYPE: ICD-10-CM

## 2023-12-27 DIAGNOSIS — N81.4 UTERINE PROLAPSE: ICD-10-CM

## 2023-12-27 DIAGNOSIS — N81.6 RECTOCELE: ICD-10-CM

## 2023-12-27 DIAGNOSIS — N81.10 FEMALE CYSTOCELE: ICD-10-CM

## 2023-12-27 DIAGNOSIS — Z46.89 PESSARY MAINTENANCE: Primary | ICD-10-CM

## 2024-03-13 ENCOUNTER — OFFICE VISIT (OUTPATIENT)
Dept: OBSTETRICS AND GYNECOLOGY | Age: 74
End: 2024-03-13
Payer: MEDICARE

## 2024-03-13 VITALS
WEIGHT: 139 LBS | BODY MASS INDEX: 25.58 KG/M2 | DIASTOLIC BLOOD PRESSURE: 80 MMHG | SYSTOLIC BLOOD PRESSURE: 152 MMHG | HEIGHT: 62 IN

## 2024-03-13 DIAGNOSIS — N81.6 RECTOCELE: ICD-10-CM

## 2024-03-13 DIAGNOSIS — N81.4 UTERINE PROLAPSE: ICD-10-CM

## 2024-03-13 DIAGNOSIS — N81.10 FEMALE CYSTOCELE: ICD-10-CM

## 2024-03-13 DIAGNOSIS — Z46.89 PESSARY MAINTENANCE: Primary | ICD-10-CM

## 2024-03-13 NOTE — PROGRESS NOTES
"PESSARY CHECK-UP    Subjective   Chief Complaint   Patient presents with    Pessary Check     Pt here today for pessary check. Pt doing well no concerns.      Sandy LAW is a 73 y.o. year old who presents to be seen for 3 month follow-up of her pessary.      Overall she is satisfied with how this pessary is doing.  She is not aware of it being present.  Overall, patient still very pleased and reports that her pessary still feels like it is fitting well.  He changed from a #4 Gelhorn to a #3 Gellhorn in the fall of 2023, and the patient reports that her pessary still fits well -she still cannot feel it at all.  She is not removing the pessary herself.  She is able to empty were bladder without difficulty, but feels like it may not get completely empty all the time - this is unchanged from previous reports.  There is urinary incontinence - just a small amount, but she has to wear a pad all the time.    There is significant vaginal discharge present at times, but other times she says it is not a problem at all.  She is using nothing to help decrease her vaginal discharge - patient says that it does not really bother her.  She is not having any difficulty with bowel function.     No Additional Complaints Reported    The following portions of the patient's history were reviewed and updated as appropriate:current medications and allergies    Review of Systems   Respiratory:  Negative for shortness of breath.    Cardiovascular:  Negative for chest pain.   Genitourinary:  Positive for enuresis (wears pads all the time.  No worse with pessary in). Negative for difficulty urinating, pelvic pain, vaginal bleeding, vaginal discharge and vaginal pain.         Objective   /80   Ht 157.5 cm (62\")   Wt 63 kg (139 lb)   BMI 25.42 kg/m²     General:  well developed; well nourished  no acute distress   Pelvis: Clinical staff was present for exam.  Vaginal mucosa intact, without erosion.  #3 Gelhorn removed, cleaned and " replaced      Lab Review   No data reviewed    Imaging   No data reviewed        Assessment   Symptomatic prolapse - well controlled with current pessary.  #3 Gelhorn much more comfortable for the patient to have removed and replaced     Plan   Her pessary was removed, cleaned and replaced   Patient will return to the office in 2 to 3 months for follow-up      No orders of the defined types were placed in this encounter.         This note was electronically signed.    Feli Mcdonald MD  March 13, 2024  13:55 CDT

## 2024-06-12 ENCOUNTER — OFFICE VISIT (OUTPATIENT)
Dept: OBSTETRICS AND GYNECOLOGY | Age: 74
End: 2024-06-12
Payer: MEDICARE

## 2024-06-12 VITALS
WEIGHT: 138 LBS | DIASTOLIC BLOOD PRESSURE: 86 MMHG | SYSTOLIC BLOOD PRESSURE: 144 MMHG | BODY MASS INDEX: 25.4 KG/M2 | HEIGHT: 62 IN

## 2024-06-12 DIAGNOSIS — N81.4 UTERINE PROLAPSE: ICD-10-CM

## 2024-06-12 DIAGNOSIS — Z46.89 PESSARY MAINTENANCE: Primary | ICD-10-CM

## 2024-06-12 DIAGNOSIS — N81.6 RECTOCELE: ICD-10-CM

## 2024-06-12 RX ORDER — NAPROXEN SODIUM 220 MG
220 TABLET ORAL 2 TIMES DAILY PRN
COMMUNITY

## 2024-06-12 RX ORDER — ACETAMINOPHEN 500 MG
500 TABLET ORAL EVERY 6 HOURS PRN
COMMUNITY

## 2024-06-12 NOTE — PROGRESS NOTES
"PESSARY CHECK-UP    Subjective   Chief Complaint   Patient presents with    Pessary Check     Pt here today for pessary check. Pt voices no concerns./      Sandy ANI is a 74 y.o. year old who presents to be seen for 3 month follow-up of her pessary.      Overall she is satisfied with how this pessary is doing.  She is not aware of it being present.  Overall, patient still very pleased and reports that her pessary still feels like it is fitting well.  She has no complaints or concerns.  She is not having any bleeding.  He changed from a #4 Gelhorn to a #3 Gellhorn in the fall of 2023, and the patient reports that her pessary still fits well -she still cannot feel it at all.  She is not removing the pessary herself.  She is able to empty were bladder without difficulty, but feels like it may not get completely empty all the time - this is unchanged from previous reports.  There is urinary incontinence - just a small amount, but she has to wear a pad all the time.    There is significant vaginal discharge present at times, but other times she says it is not a problem at all.  She is using nothing to help decrease her vaginal discharge - patient says that it does not really bother her.  She is not having any difficulty with bowel function.     No Additional Complaints Reported    The following portions of the patient's history were reviewed and updated as appropriate:current medications and allergies    Review of Systems   Respiratory:  Negative for shortness of breath.    Cardiovascular:  Negative for chest pain.   Genitourinary:  Positive for enuresis (wears pads all the time.  No worse with pessary in). Negative for difficulty urinating, pelvic pain, vaginal bleeding, vaginal discharge and vaginal pain.         Objective   /86   Ht 157.5 cm (62\")   Wt 62.6 kg (138 lb)   BMI 25.24 kg/m²     General:  well developed; well nourished  no acute distress   Pelvis: Clinical staff was present for exam.  " Vaginal mucosa intact, without erosion.  #3 Gelhorn removed, cleaned and replaced      Lab Review   No data reviewed    Imaging   No data reviewed        Assessment   Symptomatic prolapse - well controlled with current pessary.  #3 Gelhorn much more comfortable for the patient to have removed and replaced     Plan   Her pessary was removed, cleaned and replaced   Patient will return to the office in 2 to 3 months for follow-up      No orders of the defined types were placed in this encounter.         This note was electronically signed.    Feli Mcdonald MD  June 12, 2024  14:13 CDT

## 2024-09-18 ENCOUNTER — OFFICE VISIT (OUTPATIENT)
Dept: OBSTETRICS AND GYNECOLOGY | Age: 74
End: 2024-09-18
Payer: MEDICARE

## 2024-09-18 VITALS
SYSTOLIC BLOOD PRESSURE: 148 MMHG | DIASTOLIC BLOOD PRESSURE: 78 MMHG | WEIGHT: 138 LBS | HEIGHT: 62 IN | BODY MASS INDEX: 25.4 KG/M2

## 2024-09-18 DIAGNOSIS — N81.4 UTERINE PROLAPSE: ICD-10-CM

## 2024-09-18 DIAGNOSIS — N81.6 RECTOCELE: ICD-10-CM

## 2024-09-18 DIAGNOSIS — Z46.89 PESSARY MAINTENANCE: Primary | ICD-10-CM

## 2024-11-25 NOTE — PROGRESS NOTES
"PESSARY CHECK-UP    Subjective   Chief Complaint   Patient presents with    Pessary Check     Patient here today for pessary check.      Sandy LAW is a 73 y.o. year old who presents to be seen for 3 month follow-up of her pessary.      Overall she is satisfied with how this pessary is doing.  She is not aware of it being present.  Overall, patient still very pleased and reports that her pessary still feels like it is fitting well.  He changed from a #4 2 #3 Gellhorn at her last visit, and the patient does not seem to think that this is affected the way her pessary fits -she still cannot feel it at all.  She is not removing the pessary herself.  She is able to empty were bladder without difficulty, but feels like it may not get completely empty all the time - this is unchanged from previous reports.  There is urinary incontinence - just a small amount, but she has to wear a pad all the time.    There is significant vaginal discharge present at times, but other times she says it is not a problem at all.  She is using nothing to help decrease her vaginal discharge - patient says that it does not really bother her.  She is not having any difficulty with bowel function.     No Additional Complaints Reported    The following portions of the patient's history were reviewed and updated as appropriate:current medications and allergies    Review of Systems   Respiratory:  Negative for shortness of breath.    Cardiovascular:  Negative for chest pain.   Genitourinary:  Positive for enuresis (wears pads all the time.  No worse with pessary in). Negative for difficulty urinating, pelvic pain, vaginal bleeding, vaginal discharge and vaginal pain.         Objective   /78   Ht 157.5 cm (62.01\")   Wt 63.5 kg (140 lb)   BMI 25.60 kg/m²     General:  well developed; well nourished  no acute distress   Pelvis: Clinical staff was present for exam.  Vaginal mucosa intact, without erosion.  #3 Gelhorn removed, cleaned and " replaced      Lab Review   No data reviewed    Imaging   No data reviewed        Assessment   Symptomatic prolapse - well controlled with current pessary.  #3 Gelhorn much more comfortable for the patient to have removed and replaced     Plan   Her pessary was removed, cleaned and replaced   Patient will return to the office in 2 to 3 months for follow-up      No orders of the defined types were placed in this encounter.         This note was electronically signed.    Feli Mcdonald MD  December 27, 2023  17:13 CST     Sepsis Criteria were met:

## 2024-12-18 ENCOUNTER — OFFICE VISIT (OUTPATIENT)
Dept: OBSTETRICS AND GYNECOLOGY | Age: 74
End: 2024-12-18
Payer: MEDICARE

## 2024-12-18 VITALS
BODY MASS INDEX: 25.21 KG/M2 | SYSTOLIC BLOOD PRESSURE: 136 MMHG | HEIGHT: 62 IN | DIASTOLIC BLOOD PRESSURE: 82 MMHG | WEIGHT: 137 LBS

## 2024-12-18 DIAGNOSIS — N81.4 UTERINE PROLAPSE: ICD-10-CM

## 2024-12-18 DIAGNOSIS — Z46.89 PESSARY MAINTENANCE: Primary | ICD-10-CM

## 2024-12-18 NOTE — PROGRESS NOTES
"PESSARY CHECK-UP    Subjective   Chief Complaint   Patient presents with    Pessary Check     Pt here today for pessary check      Sandy LAW is a 74 y.o. year old who presents to be seen for 3 month follow-up of her pessary.      Overall she is satisfied with how this pessary is doing.  She is not aware of it being present.  Overall, patient still very pleased and reports that her pessary still feels like it is fitting well.  She has no complaints or concerns.  She is not having any bleeding.  He changed from a #4 Gelhorn to a #3 Gellhorn in the fall of 2023, and the patient reports that her pessary still fits well -she still cannot feel it at all.  She is not removing the pessary herself.  She is able to empty were bladder without difficulty, but feels like it may not get completely empty all the time - this is unchanged from previous reports.  There is urinary incontinence - just a small amount, but she has to wear a pad all the time.    There is significant vaginal discharge present at times, but other times she says it is not a problem at all.  She is using nothing to help decrease her vaginal discharge - patient says that it does not really bother her.  She is not having any difficulty with bowel function.     No Additional Complaints Reported    The following portions of the patient's history were reviewed and updated as appropriate:current medications and allergies    Review of Systems   Respiratory:  Negative for shortness of breath.    Cardiovascular:  Negative for chest pain.   Genitourinary:  Positive for enuresis (wears pads all the time.  No worse with pessary in). Negative for difficulty urinating, pelvic pain, vaginal bleeding, vaginal discharge and vaginal pain.         Objective   /82   Ht 157.5 cm (62\")   Wt 62.1 kg (137 lb)   BMI 25.06 kg/m²     General:  well developed; well nourished  no acute distress   Pelvis: Clinical staff was present for exam.  Vaginal mucosa intact,  #3 " Gelhorn removed, cleaned and replaced      Lab Review   No data reviewed    Imaging   No data reviewed        Assessment   Symptomatic prolapse - well controlled with current pessary.  #3 Gelhorn much more comfortable for the patient to have removed and replaced     Plan   Her pessary was removed, cleaned and replaced   Patient will return to the office in 2 to 3 months for follow-up      No orders of the defined types were placed in this encounter.         This note was electronically signed.    Feli Mcdonald MD  December 18, 2024  13:02 CST

## 2025-03-19 ENCOUNTER — OFFICE VISIT (OUTPATIENT)
Dept: OBSTETRICS AND GYNECOLOGY | Age: 75
End: 2025-03-19
Payer: MEDICARE

## 2025-03-19 VITALS
WEIGHT: 138.8 LBS | SYSTOLIC BLOOD PRESSURE: 156 MMHG | BODY MASS INDEX: 25.54 KG/M2 | DIASTOLIC BLOOD PRESSURE: 94 MMHG | HEIGHT: 62 IN

## 2025-03-19 DIAGNOSIS — N81.4 UTERINE PROLAPSE: ICD-10-CM

## 2025-03-19 DIAGNOSIS — N81.6 RECTOCELE: ICD-10-CM

## 2025-03-19 DIAGNOSIS — Z46.89 PESSARY MAINTENANCE: Primary | ICD-10-CM

## 2025-03-19 DIAGNOSIS — N81.10 FEMALE CYSTOCELE: ICD-10-CM

## 2025-03-19 NOTE — PROGRESS NOTES
PESSARY CHECK-UP    Subjective   Chief Complaint   Patient presents with    Pessary Check     Patient is here for pessary check. Pt wears a #3 Gelhorn pessary. Pt denies current vaginal pruritus, bleeding, or discharge. Pt voices no complaints and doing well.     Sandy LAW is a 74 y.o. year old who presents to be seen for 3 month follow-up of her pessary.      Overall she is satisfied with how this pessary is doing.  She is not aware of it being present.  Overall, patient still very pleased and reports that her pessary still feels like it is fitting well.  She has no complaints or concerns.  She is not having any bleeding.  He changed from a #4 Gelhorn to a #3 Gellhorn in the fall of 2023, and the patient reports that her pessary still fits well -she still cannot feel it at all.  She is not removing the pessary herself.  She is able to empty were bladder without difficulty, but feels like it may not get completely empty all the time, but that is just sometimes and if she goes back to bathroom a few minutes later then she feels empty  There is urinary incontinence - just a small amount, but she has to wear a pad all the time.    There is significant vaginal discharge present at times, but other times she says it is not a problem at all.  She is using nothing to help decrease her vaginal discharge - patient says that it does not really bother her.  She is not having any difficulty with bowel function.     No Additional Complaints Reported    The following portions of the patient's history were reviewed and updated as appropriate:current medications and allergies    Review of Systems   Respiratory:  Negative for shortness of breath.    Cardiovascular:  Negative for chest pain.   Genitourinary:  Positive for enuresis (wears pads all the time.  No worse with pessary in). Negative for difficulty urinating, pelvic pain, vaginal bleeding, vaginal discharge and vaginal pain.         Objective   /94   Ht 157.5  "cm (62\")   Wt 63 kg (138 lb 12.8 oz)   BMI 25.39 kg/m²     General:  well developed; well nourished  no acute distress   Pelvis: Clinical staff was present for exam.  Vaginal mucosa intact,  #3 Gelhorn removed, cleaned and replaced      Lab Review   No data reviewed    Imaging   No data reviewed        Assessment   Symptomatic prolapse - well controlled with current pessary.  #3 Gelhorn much more comfortable for the patient to have removed and replaced     Plan   Her pessary was removed, cleaned and replaced   Patient will return to the office in 3 months for follow-up      No orders of the defined types were placed in this encounter.         This note was electronically signed.    Feli Mcdonald MD  March 19, 2025  13:14 CDT    "

## 2025-06-25 ENCOUNTER — OFFICE VISIT (OUTPATIENT)
Dept: OBSTETRICS AND GYNECOLOGY | Age: 75
End: 2025-06-25
Payer: MEDICARE

## 2025-06-25 VITALS
HEIGHT: 62 IN | WEIGHT: 138 LBS | DIASTOLIC BLOOD PRESSURE: 82 MMHG | BODY MASS INDEX: 25.4 KG/M2 | SYSTOLIC BLOOD PRESSURE: 142 MMHG

## 2025-06-25 DIAGNOSIS — N81.4 UTERINE PROLAPSE: ICD-10-CM

## 2025-06-25 DIAGNOSIS — N81.10 FEMALE CYSTOCELE: ICD-10-CM

## 2025-06-25 DIAGNOSIS — Z46.89 PESSARY MAINTENANCE: Primary | ICD-10-CM

## 2025-06-25 DIAGNOSIS — N81.6 RECTOCELE: ICD-10-CM

## 2025-06-25 NOTE — PROGRESS NOTES
PESSARY CHECK-UP    Subjective   Chief Complaint   Patient presents with    Pessary Check     Pt here today for pessary check      Snady LAW is a 75 y.o. year old who presents to be seen for 3 month follow-up of her pessary.  I have confirmed with patient that she is getting R mammograms at [facility], since she has no mammograms in [facility].  Patient is s/p L mastectomy.    Overall she is satisfied with how this pessary is doing.  She is not aware of it being present.  Overall, patient still very pleased and reports that her pessary still feels like it is fitting well.  She has no complaints or concerns.  She is not having any bleeding.  He changed from a #4 Gelhorn to a #3 Gellhorn in the fall of 2023, and the patient reports that her pessary still fits well -she still cannot feel it at all.  She is not removing the pessary herself.  She is able to empty were bladder without difficulty, but feels like it may not get completely empty all the time, but that is just sometimes and if she goes back to bathroom a few minutes later then she feels empty  There is urinary incontinence - just a small amount, but she has to wear a pad all the time.    There is significant vaginal discharge present at times, but other times she says it is not a problem at all.  She is using nothing to help decrease her vaginal discharge - patient says that it does not really bother her.  She is not having any difficulty with bowel function.     No Additional Complaints Reported    The following portions of the patient's history were reviewed and updated as appropriate:current medications and allergies    Review of Systems   Respiratory:  Negative for shortness of breath.    Cardiovascular:  Negative for chest pain.   Genitourinary:  Positive for enuresis (wears pads all the time.  No worse with pessary in). Negative for difficulty urinating, pelvic pain, vaginal bleeding, vaginal discharge and vaginal pain.         Objective  "  /82   Ht 157.5 cm (62\")   Wt 62.6 kg (138 lb)   BMI 25.24 kg/m²     General:  well developed; well nourished  no acute distress   Pelvis: Clinical staff was present for exam.  Vaginal mucosa intact,  #3 Gelhorn removed, cleaned and replaced      Lab Review   No data reviewed    Imaging   No data reviewed        Assessment   Symptomatic prolapse - well controlled with current pessary.  #3 Gelhorn much more comfortable for the patient to have removed and replaced     Plan   Her pessary was removed, cleaned and replaced   Patient will return to the office in 3 months for follow-up      No orders of the defined types were placed in this encounter.         This note was electronically signed.    Feli Mcdonald MD  June 25, 2025  13:58 CDT    " 52